# Patient Record
Sex: MALE | Race: ASIAN | Employment: FULL TIME | URBAN - METROPOLITAN AREA
[De-identification: names, ages, dates, MRNs, and addresses within clinical notes are randomized per-mention and may not be internally consistent; named-entity substitution may affect disease eponyms.]

---

## 2020-01-14 ENCOUNTER — OFFICE VISIT (OUTPATIENT)
Dept: INTERNAL MEDICINE CLINIC | Facility: CLINIC | Age: 40
End: 2020-01-14
Payer: COMMERCIAL

## 2020-01-14 VITALS
BODY MASS INDEX: 46.45 KG/M2 | DIASTOLIC BLOOD PRESSURE: 85 MMHG | SYSTOLIC BLOOD PRESSURE: 136 MMHG | HEIGHT: 66 IN | TEMPERATURE: 99 F | RESPIRATION RATE: 20 BRPM | WEIGHT: 289 LBS | HEART RATE: 92 BPM

## 2020-01-14 DIAGNOSIS — L30.9 DERMATITIS: Primary | ICD-10-CM

## 2020-01-14 DIAGNOSIS — Z00.00 PHYSICAL EXAM: ICD-10-CM

## 2020-01-14 DIAGNOSIS — E66.01 MORBID OBESITY (HCC): ICD-10-CM

## 2020-01-14 DIAGNOSIS — M79.671 RIGHT FOOT PAIN: ICD-10-CM

## 2020-01-14 PROCEDURE — 99213 OFFICE O/P EST LOW 20 MIN: CPT | Performed by: INTERNAL MEDICINE

## 2020-01-14 PROCEDURE — 99395 PREV VISIT EST AGE 18-39: CPT | Performed by: INTERNAL MEDICINE

## 2020-01-14 NOTE — PROGRESS NOTES
HPI:    Patient ID: Bunny Phalen is a 42year old male. Patient presents with:     Foot Pain: Pt is coming in as a new pt and has right foot pain that started on 01/06/2020 - no hx of trauma and  physical exam     Patient presents today for physical exam Right Ear: Tympanic membrane, external ear and ear canal normal.   Left Ear: Tympanic membrane, external ear and ear canal normal.   Nose: Nose normal. Right sinus exhibits no maxillary sinus tenderness and no frontal sinus tenderness.  Left sinus exhibits Patient presents today for physical exam, states doing well otherwise, denies chest pain, shortness of breath, dyspnea on exertion or heart palpitations also denies nausea, vomiting, diarrhea, constipation or abdominal pain.  No fever, chills, or UTI sympto

## 2020-01-16 ENCOUNTER — OFFICE VISIT (OUTPATIENT)
Dept: PODIATRY CLINIC | Facility: CLINIC | Age: 40
End: 2020-01-16
Payer: COMMERCIAL

## 2020-01-16 ENCOUNTER — OFFICE VISIT (OUTPATIENT)
Dept: DERMATOLOGY CLINIC | Facility: CLINIC | Age: 40
End: 2020-01-16
Payer: COMMERCIAL

## 2020-01-16 DIAGNOSIS — M76.61 ACHILLES TENDINITIS OF RIGHT LOWER EXTREMITY: Primary | ICD-10-CM

## 2020-01-16 DIAGNOSIS — L30.9 DERMATITIS: Primary | ICD-10-CM

## 2020-01-16 PROCEDURE — 99203 OFFICE O/P NEW LOW 30 MIN: CPT | Performed by: DERMATOLOGY

## 2020-01-16 PROCEDURE — 99243 OFF/OP CNSLTJ NEW/EST LOW 30: CPT | Performed by: PODIATRIST

## 2020-01-16 PROCEDURE — L3060 FOOT ARCH SUPP LONGITUD/META: HCPCS | Performed by: PODIATRIST

## 2020-01-16 NOTE — PROGRESS NOTES
HPI:    Patient ID: Heather Shipley is a 42year old male. This 28-year-old male presents as a new patient to me on consult from [facility]. Patient is here because of he has right heel pain.   He points primarily to the retrocalcaneal area and describes it orders of the defined types were placed in this encounter.       Meds This Visit:  Requested Prescriptions      No prescriptions requested or ordered in this encounter       Imaging & Referrals:  None       #[id number]

## 2020-01-20 ENCOUNTER — TELEPHONE (OUTPATIENT)
Dept: INTERNAL MEDICINE CLINIC | Facility: CLINIC | Age: 40
End: 2020-01-20

## 2020-01-21 NOTE — TELEPHONE ENCOUNTER
Dr. Carlie Marie:     Please clarify. Are you saying patient should work on losing weight before starting Aleve?

## 2020-01-22 NOTE — TELEPHONE ENCOUNTER
Pt stated he was advised by Dr Alli Cloud to take Aleve for 10 days  - he have been on it for 6 days  Pt asking can have Labs done now or Wait after taking it for 10 days

## 2020-01-27 NOTE — PROGRESS NOTES
Miguel Dimas is a 42year old male. Patient presents with:  Eczema: LOV: 3/16/16 past pt of Dr. Troy Ferguson. Presents with complain of eczema on bilateral feet worse on Right foot and hands x 10 years.  Pt denies any family or personal hx of skin cancer Social connections:        Talks on phone: Not on file        Gets together: Not on file        Attends Mu-ism service: Not on file        Active member of club or organization: Not on file        Attends meetings of clubs or organizations: Not on file beard area this is been stable    Past notes/ records and appropriate/relevant lab results including pathology and past body maps reviewed. Updated and new information noted in current visit. Patient presents with concerns above.   Denies any other syst Potential allergen, irritants reviewed as well. Pathophysiology reviewed. Consider Contact allergy in differential.  Patient will let us know how they are doing over the next several weeks. Await clinical response to above therapy.       Wet wraps in par

## 2020-01-30 ENCOUNTER — LAB ENCOUNTER (OUTPATIENT)
Dept: LAB | Age: 40
End: 2020-01-30
Attending: INTERNAL MEDICINE
Payer: COMMERCIAL

## 2020-01-30 ENCOUNTER — OFFICE VISIT (OUTPATIENT)
Dept: PODIATRY CLINIC | Facility: CLINIC | Age: 40
End: 2020-01-30
Payer: COMMERCIAL

## 2020-01-30 DIAGNOSIS — M76.61 ACHILLES TENDINITIS OF RIGHT LOWER EXTREMITY: Primary | ICD-10-CM

## 2020-01-30 DIAGNOSIS — Z00.00 PHYSICAL EXAM: ICD-10-CM

## 2020-01-30 LAB
ALBUMIN SERPL-MCNC: 3.6 G/DL (ref 3.4–5)
ALBUMIN/GLOB SERPL: 0.8 {RATIO} (ref 1–2)
ALP LIVER SERPL-CCNC: 104 U/L (ref 45–117)
ALT SERPL-CCNC: 42 U/L (ref 16–61)
ANION GAP SERPL CALC-SCNC: 7 MMOL/L (ref 0–18)
AST SERPL-CCNC: 27 U/L (ref 15–37)
BACTERIA UR QL AUTO: NEGATIVE /HPF
BASOPHILS # BLD AUTO: 0.04 X10(3) UL (ref 0–0.2)
BASOPHILS NFR BLD AUTO: 0.5 %
BILIRUB SERPL-MCNC: 0.4 MG/DL (ref 0.1–2)
BILIRUB UR QL: NEGATIVE
BUN BLD-MCNC: 11 MG/DL (ref 7–18)
BUN/CREAT SERPL: 14.1 (ref 10–20)
CALCIUM BLD-MCNC: 10.6 MG/DL (ref 8.5–10.1)
CHLORIDE SERPL-SCNC: 108 MMOL/L (ref 98–112)
CHOLEST SMN-MCNC: 177 MG/DL (ref ?–200)
CLARITY UR: CLEAR
CO2 SERPL-SCNC: 24 MMOL/L (ref 21–32)
COLOR UR: YELLOW
CREAT BLD-MCNC: 0.78 MG/DL (ref 0.7–1.3)
DEPRECATED RDW RBC AUTO: 42.4 FL (ref 35.1–46.3)
EOSINOPHIL # BLD AUTO: 0.17 X10(3) UL (ref 0–0.7)
EOSINOPHIL NFR BLD AUTO: 2.1 %
ERYTHROCYTE [DISTWIDTH] IN BLOOD BY AUTOMATED COUNT: 12.2 % (ref 11–15)
GLOBULIN PLAS-MCNC: 4.4 G/DL (ref 2.8–4.4)
GLUCOSE BLD-MCNC: 94 MG/DL (ref 70–99)
GLUCOSE UR-MCNC: NEGATIVE MG/DL
HCT VFR BLD AUTO: 46.3 % (ref 39–53)
HDLC SERPL-MCNC: 36 MG/DL (ref 40–59)
HGB BLD-MCNC: 15.3 G/DL (ref 13–17.5)
IMM GRANULOCYTES # BLD AUTO: 0.04 X10(3) UL (ref 0–1)
IMM GRANULOCYTES NFR BLD: 0.5 %
KETONES UR-MCNC: NEGATIVE MG/DL
LDLC SERPL CALC-MCNC: 109 MG/DL (ref ?–100)
LYMPHOCYTES # BLD AUTO: 2.44 X10(3) UL (ref 1–4)
LYMPHOCYTES NFR BLD AUTO: 29.7 %
M PROTEIN MFR SERPL ELPH: 8 G/DL (ref 6.4–8.2)
MCH RBC QN AUTO: 31.2 PG (ref 26–34)
MCHC RBC AUTO-ENTMCNC: 33 G/DL (ref 31–37)
MCV RBC AUTO: 94.5 FL (ref 80–100)
MONOCYTES # BLD AUTO: 0.47 X10(3) UL (ref 0.1–1)
MONOCYTES NFR BLD AUTO: 5.7 %
NEUTROPHILS # BLD AUTO: 5.05 X10 (3) UL (ref 1.5–7.7)
NEUTROPHILS # BLD AUTO: 5.05 X10(3) UL (ref 1.5–7.7)
NEUTROPHILS NFR BLD AUTO: 61.5 %
NITRITE UR QL STRIP.AUTO: NEGATIVE
NONHDLC SERPL-MCNC: 141 MG/DL (ref ?–130)
OSMOLALITY SERPL CALC.SUM OF ELEC: 287 MOSM/KG (ref 275–295)
PATIENT FASTING Y/N/NP: YES
PATIENT FASTING Y/N/NP: YES
PH UR: 6 [PH] (ref 5–8)
PLATELET # BLD AUTO: 228 10(3)UL (ref 150–450)
POTASSIUM SERPL-SCNC: 3.9 MMOL/L (ref 3.5–5.1)
PROT UR-MCNC: NEGATIVE MG/DL
RBC # BLD AUTO: 4.9 X10(6)UL (ref 4.3–5.7)
RBC #/AREA URNS AUTO: 2 /HPF
SODIUM SERPL-SCNC: 139 MMOL/L (ref 136–145)
SP GR UR STRIP: 1.01 (ref 1–1.03)
TRIGL SERPL-MCNC: 161 MG/DL (ref 30–149)
TSI SER-ACNC: 2.04 MIU/ML (ref 0.36–3.74)
UROBILINOGEN UR STRIP-ACNC: <2
VLDLC SERPL CALC-MCNC: 32 MG/DL (ref 0–30)
WBC # BLD AUTO: 8.2 X10(3) UL (ref 4–11)
WBC #/AREA URNS AUTO: 8 /HPF

## 2020-01-30 PROCEDURE — 81001 URINALYSIS AUTO W/SCOPE: CPT

## 2020-01-30 PROCEDURE — 84443 ASSAY THYROID STIM HORMONE: CPT

## 2020-01-30 PROCEDURE — 99213 OFFICE O/P EST LOW 20 MIN: CPT | Performed by: PODIATRIST

## 2020-01-30 PROCEDURE — 80053 COMPREHEN METABOLIC PANEL: CPT

## 2020-01-30 PROCEDURE — 80061 LIPID PANEL: CPT

## 2020-01-30 PROCEDURE — 85025 COMPLETE CBC W/AUTO DIFF WBC: CPT

## 2020-01-30 PROCEDURE — 36415 COLL VENOUS BLD VENIPUNCTURE: CPT

## 2020-01-30 NOTE — PROGRESS NOTES
HPI:    Patient ID: Last Sexton is a 42year old male. This 72-year-old male presents for follow-up and states on questioning that he is is much is 90% better. He is very happy with the progression of relief of symptom.   Patient would state that he is

## 2020-01-31 ENCOUNTER — TELEPHONE (OUTPATIENT)
Dept: PODIATRY CLINIC | Facility: CLINIC | Age: 40
End: 2020-01-31

## 2020-01-31 NOTE — TELEPHONE ENCOUNTER
S/w pt and he states he checked with his insurance and Orthotics are covered but Dr. Edel Dubon is considered out of network for him. He states Dr. Fred Cao is in network. Pt wants to be seen and have orthotics made by Dr. Fred Cao so they are covered.  Dr. Nicholson Care

## 2020-02-03 NOTE — PROGRESS NOTES
Please call patient with blood test results. Kidney and liver function are normal, no anemia.    Cholesterol is mildly elevated keep with low saturated fat diet ,less red meat ,sausages ,estes and low sugar and carbohydrate diet  sugar is normal,  Calciu

## 2020-02-03 NOTE — TELEPHONE ENCOUNTER
Pt returned the call. Pt scheduled appointment with Dr. Lucius De La Torre on 2-19-20. Does pt need to do anything prior the appointment.   Please call

## 2020-02-03 NOTE — TELEPHONE ENCOUNTER
S/w pt and informed him since he already check with his insurance about orthotics being covered there is nothing else he needs to do prior to appt.

## 2020-02-19 ENCOUNTER — OFFICE VISIT (OUTPATIENT)
Dept: PODIATRY CLINIC | Facility: CLINIC | Age: 40
End: 2020-02-19
Payer: COMMERCIAL

## 2020-02-19 ENCOUNTER — TELEPHONE (OUTPATIENT)
Dept: PODIATRY CLINIC | Facility: CLINIC | Age: 40
End: 2020-02-19

## 2020-02-19 DIAGNOSIS — M76.61 ACHILLES TENDINITIS OF RIGHT LOWER EXTREMITY: Primary | ICD-10-CM

## 2020-02-19 DIAGNOSIS — M72.2 PLANTAR FASCIITIS OF RIGHT FOOT: ICD-10-CM

## 2020-02-19 PROCEDURE — 29799 UNLISTED PX CASTING/STRPG: CPT | Performed by: PODIATRIST

## 2020-02-19 PROCEDURE — L3000 FT INSERT UCB BERKELEY SHELL: HCPCS | Performed by: PODIATRIST

## 2020-02-19 NOTE — PROGRESS NOTES
Raf Fuentes is a 42year old male. Patient presents with:  Orthotic Status: pt in office to have orthotics casted. pt is pt of Dr Rita Arriaga, but then found out Dr Rita Arriaga was out of network. insurance told him that it was ok to be casted by Dr Dexter Steele. chest pain on exertion  GI: denies abdominal pain and denies heartburn  NEURO: denies headaches    EXAM:   There were no vitals taken for this visit.   GENERAL: well developed, well nourished, in no apparent distress  EXTREMITIES:   Today well-maintained th

## 2020-02-25 ENCOUNTER — OFFICE VISIT (OUTPATIENT)
Dept: INTERNAL MEDICINE CLINIC | Facility: CLINIC | Age: 40
End: 2020-02-25
Payer: COMMERCIAL

## 2020-02-25 VITALS
RESPIRATION RATE: 20 BRPM | HEART RATE: 80 BPM | TEMPERATURE: 98 F | HEIGHT: 66 IN | SYSTOLIC BLOOD PRESSURE: 130 MMHG | WEIGHT: 291.81 LBS | DIASTOLIC BLOOD PRESSURE: 80 MMHG | BODY MASS INDEX: 46.9 KG/M2

## 2020-02-25 DIAGNOSIS — E04.9 GOITER: ICD-10-CM

## 2020-02-25 DIAGNOSIS — M79.671 RIGHT FOOT PAIN: ICD-10-CM

## 2020-02-25 DIAGNOSIS — E66.01 MORBID OBESITY (HCC): ICD-10-CM

## 2020-02-25 DIAGNOSIS — E78.00 PURE HYPERCHOLESTEROLEMIA: Primary | ICD-10-CM

## 2020-02-25 PROCEDURE — 99214 OFFICE O/P EST MOD 30 MIN: CPT | Performed by: INTERNAL MEDICINE

## 2020-02-25 NOTE — PROGRESS NOTES
HPI:    Patient ID: Lizzie Mckeon is a 42year old male. Patient presents with:  Patient presents with: Foot Pain: Pt is f/u fromlast visit with foot pain and after seen the podiatry.     Patient presents today for states doing well otherwise  but has • triamcinolone acetonide 0.1 % External Cream Apply topically 2 (two) times daily. 454 g 6     Allergies:Not on File   PHYSICAL EXAM:   Physical Exam   Constitutional: He is oriented to person, place, and time.  He appears well-developed and well-nourished · fruits and vegetables   · Keep active /walking ,exercise as  tolerated  · Reach ideal weight   · Continue present management   Labs discussed with patient continue losing weight    Exercise as tolerated      History alopecia-intermittent   Patient using

## 2020-03-16 ENCOUNTER — HOSPITAL ENCOUNTER (OUTPATIENT)
Dept: ULTRASOUND IMAGING | Age: 40
Discharge: HOME | End: 2020-03-16
Attending: INTERNAL MEDICINE
Payer: COMMERCIAL

## 2020-03-16 DIAGNOSIS — E04.9 GOITER: ICD-10-CM

## 2020-03-16 PROCEDURE — 76536 US EXAM OF HEAD AND NECK: CPT | Performed by: INTERNAL MEDICINE

## 2020-03-16 NOTE — TELEPHONE ENCOUNTER
Orthotics arrived in [place] office 3/16/20    LMTCB    (phone room, pls schedule appt at [address] [place] [address] office for orthotic )

## 2020-03-17 ENCOUNTER — OFFICE VISIT (OUTPATIENT)
Dept: PODIATRY CLINIC | Facility: CLINIC | Age: 40
End: 2020-03-17
Payer: COMMERCIAL

## 2020-03-17 DIAGNOSIS — M72.2 PLANTAR FASCIITIS OF RIGHT FOOT: ICD-10-CM

## 2020-03-17 DIAGNOSIS — M76.61 ACHILLES TENDINITIS OF RIGHT LOWER EXTREMITY: Primary | ICD-10-CM

## 2020-03-17 PROCEDURE — 99212 OFFICE O/P EST SF 10 MIN: CPT | Performed by: PODIATRIST

## 2020-03-19 NOTE — PROGRESS NOTES
Please call patient with ultrasound  test results.  =====  Showing Tiny benign appearing nodules in the right and left lobe of the thyroid. Mildly enlarged thyroid. Will recheck ultrasound thyroid in 1 year to assess stability of the nodules .      shea

## 2020-03-20 NOTE — PROGRESS NOTES
Rocio Dutton is a 42year old male. Patient presents with:  Orthotic Status: pt in office to  orthotics pt with no additional complaints or concerns today        HPI:   Patient returns the clinic to  his orthotics.   At today's visit shruti apparent distress  EXTREMITIES:   Orthoses were checked against the bottom of his feet and found to be of good fit. I fit them into his shoe gear.   ASSESSMENT AND PLAN:   Diagnoses and all orders for this visit:    Achilles tendinitis of right lower extre

## 2020-06-23 ENCOUNTER — OFFICE VISIT (OUTPATIENT)
Dept: INTERNAL MEDICINE CLINIC | Facility: CLINIC | Age: 40
End: 2020-06-23
Payer: COMMERCIAL

## 2020-06-23 VITALS
SYSTOLIC BLOOD PRESSURE: 125 MMHG | HEART RATE: 76 BPM | DIASTOLIC BLOOD PRESSURE: 78 MMHG | WEIGHT: 290 LBS | HEIGHT: 66 IN | BODY MASS INDEX: 46.61 KG/M2

## 2020-06-23 DIAGNOSIS — E66.01 MORBID OBESITY (HCC): ICD-10-CM

## 2020-06-23 DIAGNOSIS — J30.9 ALLERGIC RHINITIS, UNSPECIFIED SEASONALITY, UNSPECIFIED TRIGGER: ICD-10-CM

## 2020-06-23 DIAGNOSIS — E78.00 PURE HYPERCHOLESTEROLEMIA: ICD-10-CM

## 2020-06-23 DIAGNOSIS — M79.671 RIGHT FOOT PAIN: Primary | ICD-10-CM

## 2020-06-23 PROCEDURE — 99214 OFFICE O/P EST MOD 30 MIN: CPT | Performed by: INTERNAL MEDICINE

## 2020-06-23 NOTE — PROGRESS NOTES
HPI:    Patient ID: Fabian Odom is a 42year old male. Patient presents with:   Foot Pain: 3m f/u , right foot painl level of 5, pt states orthodics is helping and had not had pain very often  Chest Congestion: felt chest vibration from May/june well-developed and well-nourished. No distress. Obese   HENT:   Head: Normocephalic and atraumatic.    Right Ear: Tympanic membrane, external ear and ear canal normal.   Left Ear: Tympanic membrane, external ear and ear canal normal.   Nose: Nose normal. turkey, and fish)  Exercise/walk regularly as tolerated  The risks and benefits of the treatment plan have been discussed with the patient  Patient education      Pure hypercholesterolemia  · Keep low saturated fat  diet   · Avoid red meat and fast/fried f

## 2021-04-01 DIAGNOSIS — Z23 NEED FOR VACCINATION: ICD-10-CM

## 2021-12-18 ENCOUNTER — NURSE TRIAGE (OUTPATIENT)
Dept: INTERNAL MEDICINE CLINIC | Facility: CLINIC | Age: 41
End: 2021-12-18

## 2021-12-18 ENCOUNTER — APPOINTMENT (OUTPATIENT)
Dept: CT IMAGING | Age: 41
End: 2021-12-18
Attending: PHYSICIAN ASSISTANT
Payer: COMMERCIAL

## 2021-12-18 ENCOUNTER — HOSPITAL ENCOUNTER (OUTPATIENT)
Age: 41
Discharge: HOME | End: 2021-12-18
Payer: COMMERCIAL

## 2021-12-18 VITALS
DIASTOLIC BLOOD PRESSURE: 90 MMHG | SYSTOLIC BLOOD PRESSURE: 150 MMHG | OXYGEN SATURATION: 98 % | TEMPERATURE: 98 F | RESPIRATION RATE: 18 BRPM | HEART RATE: 72 BPM

## 2021-12-18 DIAGNOSIS — N20.1 URETERAL STONE: Primary | ICD-10-CM

## 2021-12-18 PROCEDURE — 81002 URINALYSIS NONAUTO W/O SCOPE: CPT

## 2021-12-18 PROCEDURE — 99204 OFFICE O/P NEW MOD 45 MIN: CPT

## 2021-12-18 PROCEDURE — 74176 CT ABD & PELVIS W/O CONTRAST: CPT | Performed by: PHYSICIAN ASSISTANT

## 2021-12-18 PROCEDURE — 36415 COLL VENOUS BLD VENIPUNCTURE: CPT

## 2021-12-18 PROCEDURE — 99214 OFFICE O/P EST MOD 30 MIN: CPT

## 2021-12-18 PROCEDURE — 80047 BASIC METABLC PNL IONIZED CA: CPT

## 2021-12-18 PROCEDURE — 85025 COMPLETE CBC W/AUTO DIFF WBC: CPT | Performed by: PHYSICIAN ASSISTANT

## 2021-12-18 RX ORDER — TAMSULOSIN HYDROCHLORIDE 0.4 MG/1
0.4 CAPSULE ORAL DAILY
Qty: 7 CAPSULE | Refills: 0 | Status: SHIPPED | OUTPATIENT
Start: 2021-12-18 | End: 2021-12-25

## 2021-12-18 NOTE — ED PROVIDER NOTES
Patient Seen in: [facility]      History   Patient presents with:  Urinary Symptoms    Stated Complaint: pink urine    Subjective:   HPI    47-year-old male with past medical history of hyperlipidemia here for evaluation of urine discoloratio Neurological:      General: No focal deficit present. Mental Status: He is alert and oriented to person, place, and time.    Psychiatric:         Mood and Affect: Mood normal.         Behavior: Behavior normal.               ED Course     Labs Review

## 2021-12-18 NOTE — TELEPHONE ENCOUNTER
Action Requested: Summary for Provider     []  Critical Lab, Recommendations Needed  [] Need Additional Advice  []   FYI    []   Need Orders  [] Need Medications Sent to Pharmacy  [x]  Other     SUMMARY: Verified name and .   Patient states that he has b

## 2021-12-20 ENCOUNTER — TELEPHONE (OUTPATIENT)
Dept: INTERNAL MEDICINE CLINIC | Facility: CLINIC | Age: 41
End: 2021-12-20

## 2021-12-20 DIAGNOSIS — N20.0 KIDNEY STONES: Primary | ICD-10-CM

## 2021-12-20 NOTE — TELEPHONE ENCOUNTER
Advised patient of Dr. J Carlos Wellington note and number given to urologist. Patient verbalized understanding. Appointment made for 12/22/2021 at 1:20pm with Dr Marcos Wu in [place].

## 2021-12-20 NOTE — TELEPHONE ENCOUNTER
Patient can see me for follow-up visit after the ER  We can use TCM appointment on Wednesday 1:00-p.m.     Follow-up ER visit for kidney stones      Patient to increase water intake    Urology referral placed in the system-

## 2021-12-20 NOTE — TELEPHONE ENCOUNTER
Patient called to inform that he was diagnosed with kidney stones at an [facility] on 12/18/2021. He is requesting a follow up appointment, but there are no openings until 12/30/2021.  Patient would also like to know if he needs to schedule with a urolo

## 2021-12-22 ENCOUNTER — OFFICE VISIT (OUTPATIENT)
Dept: INTERNAL MEDICINE CLINIC | Facility: CLINIC | Age: 41
End: 2021-12-22
Payer: COMMERCIAL

## 2021-12-22 VITALS
BODY MASS INDEX: 47.25 KG/M2 | WEIGHT: 294 LBS | DIASTOLIC BLOOD PRESSURE: 88 MMHG | SYSTOLIC BLOOD PRESSURE: 143 MMHG | HEART RATE: 67 BPM | HEIGHT: 66 IN

## 2021-12-22 DIAGNOSIS — Z00.00 PHYSICAL EXAM: ICD-10-CM

## 2021-12-22 DIAGNOSIS — E78.00 PURE HYPERCHOLESTEROLEMIA: ICD-10-CM

## 2021-12-22 DIAGNOSIS — L65.9 ALOPECIA: ICD-10-CM

## 2021-12-22 DIAGNOSIS — E66.01 MORBID OBESITY (HCC): ICD-10-CM

## 2021-12-22 DIAGNOSIS — N20.0 NEPHROLITHIASIS: Primary | ICD-10-CM

## 2021-12-22 DIAGNOSIS — E04.1 THYROID NODULE: ICD-10-CM

## 2021-12-22 DIAGNOSIS — R73.9 ELEVATED BLOOD SUGAR: ICD-10-CM

## 2021-12-22 PROCEDURE — 3077F SYST BP >= 140 MM HG: CPT | Performed by: INTERNAL MEDICINE

## 2021-12-22 PROCEDURE — 3079F DIAST BP 80-89 MM HG: CPT | Performed by: INTERNAL MEDICINE

## 2021-12-22 PROCEDURE — 3008F BODY MASS INDEX DOCD: CPT | Performed by: INTERNAL MEDICINE

## 2021-12-22 PROCEDURE — 99214 OFFICE O/P EST MOD 30 MIN: CPT | Performed by: INTERNAL MEDICINE

## 2021-12-22 NOTE — PROGRESS NOTES
Subjective:   Patient ID: Lucille Brunner is a 46year old male. Patient presents with:  Urgent Care F/u: Stts that he was seen at [facility] on 12/18/21 due to some blood in the urine.   Hair/Scalp Problem      Patient in office today for  F/u i Appearance: He is well-developed. Interventions: Face mask in place. HENT:      Head: Normocephalic and atraumatic. Nose:      Right Sinus: No maxillary sinus tenderness or frontal sinus tenderness.       Left Sinus: No maxillary sinus tendernes Small fat containing right inguinal hernia.              2Alopecia  Had in 2016 and 2018   Pt  Was on  Steroids  Oral   Local steroid cream   F/u  Derm       3. Pure hypercholesterolemia  Low  Fat diet   Labs     4.  Morbid obesity ([address]  Eat healthy, well

## 2021-12-27 ENCOUNTER — TELEPHONE (OUTPATIENT)
Dept: INTERNAL MEDICINE CLINIC | Facility: CLINIC | Age: 41
End: 2021-12-27

## 2021-12-27 RX ORDER — TAMSULOSIN HYDROCHLORIDE 0.4 MG/1
0.4 CAPSULE ORAL DAILY
Qty: 21 CAPSULE | Refills: 0 | Status: SHIPPED | OUTPATIENT
Start: 2021-12-27 | End: 2022-01-11

## 2021-12-27 NOTE — TELEPHONE ENCOUNTER
Its ok to wait -as long as pt does not have symptoms  blood in urine pain . .   flomax sent to pharmacy

## 2021-12-27 NOTE — TELEPHONE ENCOUNTER
Patient called requesting refill for flomax,  (Generic:tamsulosin) also advising that kidney stone has not passed.    Currently out of medication, finished 7 pill regime on 12/25/2021

## 2021-12-27 NOTE — TELEPHONE ENCOUNTER
Patient states the earliest he could get an appointment with urology is 1/24 with Dr. Esa Feliciano. Patient states he's not in any pain currently, but would like to know if this wait time is ok with Dr. Huber Dyer.

## 2022-01-11 NOTE — TELEPHONE ENCOUNTER
Please review. Protocol failed / No protocol.    Requested Prescriptions   Pending Prescriptions Disp Refills    tamsulosin (FLOMAX) cap [Pharmacy Med Name: TAMSULOSIN HCL 0.4 MG CAPSULE] 21 capsule 0     Sig: TAKE 1 CAPSULE BY MOUTH EVERY DAY        There

## 2022-01-14 RX ORDER — TAMSULOSIN HYDROCHLORIDE 0.4 MG/1
0.4 CAPSULE ORAL DAILY
Qty: 21 CAPSULE | Refills: 0 | Status: SHIPPED | OUTPATIENT
Start: 2022-01-14

## 2022-01-19 NOTE — TELEPHONE ENCOUNTER
Advised patient refill for Flomax sent on 1/14/22 continue to take until appt with urologist on 1/24/22, patient agreed.

## 2022-01-24 ENCOUNTER — OFFICE VISIT (OUTPATIENT)
Dept: SURGERY | Facility: CLINIC | Age: 42
End: 2022-01-24
Payer: COMMERCIAL

## 2022-01-24 ENCOUNTER — HOSPITAL ENCOUNTER (OUTPATIENT)
Dept: GENERAL RADIOLOGY | Age: 42
Discharge: HOME | End: 2022-01-24
Attending: UROLOGY
Payer: COMMERCIAL

## 2022-01-24 ENCOUNTER — TELEPHONE (OUTPATIENT)
Dept: SURGERY | Facility: CLINIC | Age: 42
End: 2022-01-24

## 2022-01-24 VITALS — HEART RATE: 70 BPM | SYSTOLIC BLOOD PRESSURE: 138 MMHG | DIASTOLIC BLOOD PRESSURE: 84 MMHG

## 2022-01-24 DIAGNOSIS — N20.1 URETERAL STONE: ICD-10-CM

## 2022-01-24 DIAGNOSIS — N20.1 URETERAL STONE: Primary | ICD-10-CM

## 2022-01-24 PROCEDURE — 99244 OFF/OP CNSLTJ NEW/EST MOD 40: CPT | Performed by: UROLOGY

## 2022-01-24 PROCEDURE — 3075F SYST BP GE 130 - 139MM HG: CPT | Performed by: UROLOGY

## 2022-01-24 PROCEDURE — 74021 RADEX ABDOMEN 3+ VIEWS: CPT | Performed by: UROLOGY

## 2022-01-24 PROCEDURE — 3079F DIAST BP 80-89 MM HG: CPT | Performed by: UROLOGY

## 2022-01-24 NOTE — TELEPHONE ENCOUNTER
Dear Shaneka Gill,  This patient needs to be scheduled as follows:  Diagnosis: Left ureteral stone  Procedure: Cystoscopy, left ureteroscopy, laser lithotripsy, left stent placement  Site: [facility]  Anesthesia: General endotracheal  Antibiotics

## 2022-01-24 NOTE — PROGRESS NOTES
SUBJECTIVE:  Roque North is a 46year old male who presents for a consultation at the request of, and a copy of this note will be sent to, Dr. Stephanie Bo, for evaluation of  hematuria and urinary stone. He states that the problem is unchanged. for:  None. All other ROS reviewed and otherwise normal.    OBJECTIVE:  /84   Pulse 70   He appears well, in no apparent distress. Alert and oriented times three, pleasant and cooperative.   CARDIOVASCULAR:normal apical impulse  RESPIRATORY: no ches

## 2022-01-25 NOTE — TELEPHONE ENCOUNTER
The radiologist report does not mention a ureteral stone but I believe I do see 1 in his distal left ureter. I would schedule him for at least 2 weeks out for surgery and he should continue to strain the urine.   If he passes the stone we can always cancel

## 2022-01-25 NOTE — TELEPHONE ENCOUNTER
Dear Ana Serrato, can you please take a look at patient' x-ray done yesterday, please advise if patient still needs to proceed with surgery. I will place report on your desk.  Thanks [employer]

## 2022-01-26 NOTE — TELEPHONE ENCOUNTER
Spoke with patient, does not want to schedule right now, will get all labs done, will continue to strain urine as advised, will call back when he' ready to schedule, please transfer to [id number]

## 2022-01-27 NOTE — TELEPHONE ENCOUNTER
[facility] msg received via [facility] sent last evening 1/26 for Dr. Joo Pina.      Msg forwarded to BK - see below to advise.   ______________  Dear Dr. Joo Pina,     I have been discussing the results of my X-Ray test with Lane Keys over the phone, and I'm still confu

## 2022-01-28 ENCOUNTER — LAB ENCOUNTER (OUTPATIENT)
Dept: LAB | Age: 42
End: 2022-01-28
Attending: UROLOGY
Payer: COMMERCIAL

## 2022-01-28 ENCOUNTER — TELEPHONE (OUTPATIENT)
Dept: SURGERY | Facility: CLINIC | Age: 42
End: 2022-01-28

## 2022-01-28 DIAGNOSIS — R73.9 ELEVATED BLOOD SUGAR: ICD-10-CM

## 2022-01-28 DIAGNOSIS — Z01.818 PREOP EXAMINATION: ICD-10-CM

## 2022-01-28 DIAGNOSIS — Z00.00 PHYSICAL EXAM: ICD-10-CM

## 2022-01-28 LAB
ALBUMIN SERPL-MCNC: 3.9 G/DL (ref 3.4–5)
ALBUMIN/GLOB SERPL: 1 {RATIO} (ref 1–2)
ALP LIVER SERPL-CCNC: 113 U/L
ALT SERPL-CCNC: 40 U/L
ANION GAP SERPL CALC-SCNC: 3 MMOL/L (ref 0–18)
AST SERPL-CCNC: 23 U/L (ref 15–37)
BASOPHILS # BLD AUTO: 0.05 X10(3) UL (ref 0–0.2)
BASOPHILS NFR BLD AUTO: 0.6 %
BILIRUB SERPL-MCNC: 0.5 MG/DL (ref 0.1–2)
BILIRUB UR QL: NEGATIVE
BUN BLD-MCNC: 9 MG/DL (ref 7–18)
BUN/CREAT SERPL: 11.3 (ref 10–20)
CALCIUM BLD-MCNC: 11.3 MG/DL (ref 8.5–10.1)
CHLORIDE SERPL-SCNC: 103 MMOL/L (ref 98–112)
CHOLEST SERPL-MCNC: 185 MG/DL (ref ?–200)
CLARITY UR: CLEAR
CO2 SERPL-SCNC: 28 MMOL/L (ref 21–32)
COLOR UR: YELLOW
CREAT BLD-MCNC: 0.8 MG/DL
DEPRECATED RDW RBC AUTO: 42.3 FL (ref 35.1–46.3)
EOSINOPHIL # BLD AUTO: 0.22 X10(3) UL (ref 0–0.7)
EOSINOPHIL NFR BLD AUTO: 2.5 %
ERYTHROCYTE [DISTWIDTH] IN BLOOD BY AUTOMATED COUNT: 12.1 % (ref 11–15)
EST. AVERAGE GLUCOSE BLD GHB EST-MCNC: 131 MG/DL (ref 68–126)
FASTING PATIENT LIPID ANSWER: YES
FASTING STATUS PATIENT QL REPORTED: YES
GLOBULIN PLAS-MCNC: 3.9 G/DL (ref 2.8–4.4)
GLUCOSE BLD-MCNC: 105 MG/DL (ref 70–99)
GLUCOSE UR-MCNC: NEGATIVE MG/DL
HBA1C MFR BLD: 6.2 % (ref ?–5.7)
HCT VFR BLD AUTO: 45.7 %
HDLC SERPL-MCNC: 42 MG/DL (ref 40–59)
HGB BLD-MCNC: 14.9 G/DL
IMM GRANULOCYTES # BLD AUTO: 0.03 X10(3) UL (ref 0–1)
IMM GRANULOCYTES NFR BLD: 0.3 %
LDLC SERPL CALC-MCNC: 115 MG/DL (ref ?–100)
LYMPHOCYTES # BLD AUTO: 2.61 X10(3) UL (ref 1–4)
LYMPHOCYTES NFR BLD AUTO: 29.6 %
MCH RBC QN AUTO: 31 PG (ref 26–34)
MCHC RBC AUTO-ENTMCNC: 32.6 G/DL (ref 31–37)
MCV RBC AUTO: 95.2 FL
MONOCYTES # BLD AUTO: 0.53 X10(3) UL (ref 0.1–1)
MONOCYTES NFR BLD AUTO: 6 %
NEUTROPHILS # BLD AUTO: 5.39 X10 (3) UL (ref 1.5–7.7)
NEUTROPHILS # BLD AUTO: 5.39 X10(3) UL (ref 1.5–7.7)
NEUTROPHILS NFR BLD AUTO: 61 %
NITRITE UR QL STRIP.AUTO: NEGATIVE
NONHDLC SERPL-MCNC: 143 MG/DL (ref ?–130)
OSMOLALITY SERPL CALC.SUM OF ELEC: 277 MOSM/KG (ref 275–295)
PH UR: 7 [PH] (ref 5–8)
PLATELET # BLD AUTO: 228 10(3)UL (ref 150–450)
POTASSIUM SERPL-SCNC: 4 MMOL/L (ref 3.5–5.1)
PROT SERPL-MCNC: 7.8 G/DL (ref 6.4–8.2)
PROT UR-MCNC: NEGATIVE MG/DL
RBC # BLD AUTO: 4.8 X10(6)UL
SODIUM SERPL-SCNC: 134 MMOL/L (ref 136–145)
SP GR UR STRIP: 1 (ref 1–1.03)
TRIGL SERPL-MCNC: 157 MG/DL (ref 30–149)
TSI SER-ACNC: 2.07 MIU/ML (ref 0.36–3.74)
UROBILINOGEN UR STRIP-ACNC: <2
VLDLC SERPL CALC-MCNC: 27 MG/DL (ref 0–30)
WBC # BLD AUTO: 8.8 X10(3) UL (ref 4–11)

## 2022-01-28 PROCEDURE — 84443 ASSAY THYROID STIM HORMONE: CPT

## 2022-01-28 PROCEDURE — 87086 URINE CULTURE/COLONY COUNT: CPT | Performed by: UROLOGY

## 2022-01-28 PROCEDURE — 81001 URINALYSIS AUTO W/SCOPE: CPT

## 2022-01-28 PROCEDURE — 80061 LIPID PANEL: CPT

## 2022-01-28 PROCEDURE — 80053 COMPREHEN METABOLIC PANEL: CPT

## 2022-01-28 PROCEDURE — 36415 COLL VENOUS BLD VENIPUNCTURE: CPT

## 2022-01-28 PROCEDURE — 85025 COMPLETE CBC W/AUTO DIFF WBC: CPT

## 2022-01-28 PROCEDURE — 83036 HEMOGLOBIN GLYCOSYLATED A1C: CPT

## 2022-01-28 NOTE — TELEPHONE ENCOUNTER
Mindy Callahan patient sent you a my chart message, stating that although I explained x-ray results to him, he needs more clarity regarding surgery, please advise, thanks jenn

## 2022-02-01 NOTE — TELEPHONE ENCOUNTER
S/W pt and gave the results in [facility] msg as stated below and pt verbalized understanding and compliance. [facility]. # given.

## 2022-02-01 NOTE — TELEPHONE ENCOUNTER
I tried to call the patient. Left a voicemail. Urology staff please call him back. Let him know that I reviewed his x-ray. Although the stone is not seen based on radiologist interpretation I suspect it may still be there as he continues to have some symptoms. In order to answer the question of whether the stone is persistent or is passed I would recommend repeating a CT stone protocol. An order for this has been entered. Please have them try to obtain this this week if possible.

## 2022-02-04 ENCOUNTER — TELEPHONE (OUTPATIENT)
Dept: SURGERY | Facility: CLINIC | Age: 42
End: 2022-02-04

## 2022-02-09 ENCOUNTER — HOSPITAL ENCOUNTER (OUTPATIENT)
Dept: CT IMAGING | Age: 42
Discharge: HOME | End: 2022-02-09
Attending: UROLOGY
Payer: COMMERCIAL

## 2022-02-09 DIAGNOSIS — N20.1 LEFT URETERAL STONE: ICD-10-CM

## 2022-02-09 PROCEDURE — 74176 CT ABD & PELVIS W/O CONTRAST: CPT | Performed by: UROLOGY

## 2022-02-09 NOTE — TELEPHONE ENCOUNTER
Reviewed patient's questions. The stone should be seen as it is a sizable stone on his previous CT scan. Small little flecks of white are not likely to be the stone and it is not likely to fragment into fragments this small but likely stone fragments that should be felt and seen clearly by the patient. I am not sure which lab result specifically had questions on. His urine culture is negative and his urine analysis is unremarkable. I would recommend that he proceed with a stone protocol CT as discussed in follow-up shortly afterwards for an office visit to review those results with me. It is important the CT scan is done to answer the question of whether the stone is still persistent.

## 2022-02-09 NOTE — TELEPHONE ENCOUNTER
Patient was contacted and stated that he had a CT Scan done today 2/9/2022. Patient was informed of test results for last CT Scan. Patient was asking if he needs to continue Tamsulosin 4mg. Because he needs a refill patient only has 3 left.

## 2022-02-10 NOTE — TELEPHONE ENCOUNTER
Call patient back. Let him know his CT scan shows migration and resolution of his previously obstructing ureteral stone. Tiny if he is scheduled for any procedures please cancel those. He may discontinue tamsulosin. He does have a sizable stone on the right side measuring about a centimeter or so in size. He may wish to consider ESWL shockwave treatment to address the stone although this does not need to be done on an emergency basis could be done at his discretion if he is asymptomatic.

## 2022-02-10 NOTE — TELEPHONE ENCOUNTER
Spoke with pt and notified him of result note from ALTHEA. Ok to stop flomax. Reassured him that no ureteral stone present based on CT scan. Pt wondering when to follow up. Instructed pt to follow up on at least a yearly basis with ALTHEA, and if develops symptoms to come back on prn basis. Also instructed pt to call office if symptoms such as flank pain, n/v, urinating symptoms, or visible blood in urine occur. Verbalized understanding.

## 2022-10-21 ENCOUNTER — HOSPITAL ENCOUNTER (OUTPATIENT)
Dept: ULTRASOUND IMAGING | Age: 42
Discharge: HOME | End: 2022-10-21
Attending: INTERNAL MEDICINE
Payer: COMMERCIAL

## 2022-10-21 DIAGNOSIS — E04.1 THYROID NODULE: ICD-10-CM

## 2022-10-21 PROCEDURE — 76536 US EXAM OF HEAD AND NECK: CPT | Performed by: INTERNAL MEDICINE

## 2022-10-28 ENCOUNTER — LAB ENCOUNTER (OUTPATIENT)
Dept: LAB | Age: 42
End: 2022-10-28
Attending: INTERNAL MEDICINE
Payer: COMMERCIAL

## 2022-10-28 DIAGNOSIS — E04.9 ENLARGED THYROID GLAND: ICD-10-CM

## 2022-10-28 DIAGNOSIS — Z00.00 PHYSICAL EXAM: ICD-10-CM

## 2022-10-28 DIAGNOSIS — E78.00 PURE HYPERCHOLESTEROLEMIA: ICD-10-CM

## 2022-10-28 DIAGNOSIS — E04.1 THYROID NODULE: ICD-10-CM

## 2022-10-28 LAB
ALBUMIN SERPL-MCNC: 3.8 G/DL (ref 3.4–5)
ALBUMIN/GLOB SERPL: 1 {RATIO} (ref 1–2)
ALP LIVER SERPL-CCNC: 111 U/L
ALT SERPL-CCNC: 38 U/L
ANION GAP SERPL CALC-SCNC: 6 MMOL/L (ref 0–18)
AST SERPL-CCNC: 22 U/L (ref 15–37)
BASOPHILS # BLD AUTO: 0.07 X10(3) UL (ref 0–0.2)
BASOPHILS NFR BLD AUTO: 0.9 %
BILIRUB SERPL-MCNC: 0.4 MG/DL (ref 0.1–2)
BILIRUB UR QL: NEGATIVE
BUN BLD-MCNC: 9 MG/DL (ref 7–18)
BUN/CREAT SERPL: 11.1 (ref 10–20)
CALCIUM BLD-MCNC: 10.4 MG/DL (ref 8.5–10.1)
CHLORIDE SERPL-SCNC: 104 MMOL/L (ref 98–112)
CHOLEST SERPL-MCNC: 174 MG/DL (ref ?–200)
CLARITY UR: CLEAR
CO2 SERPL-SCNC: 27 MMOL/L (ref 21–32)
COLOR UR: COLORLESS
CREAT BLD-MCNC: 0.81 MG/DL
DEPRECATED RDW RBC AUTO: 43.3 FL (ref 35.1–46.3)
EOSINOPHIL # BLD AUTO: 0.28 X10(3) UL (ref 0–0.7)
EOSINOPHIL NFR BLD AUTO: 3.4 %
ERYTHROCYTE [DISTWIDTH] IN BLOOD BY AUTOMATED COUNT: 12.2 % (ref 11–15)
FASTING PATIENT LIPID ANSWER: YES
FASTING STATUS PATIENT QL REPORTED: YES
GFR SERPLBLD BASED ON 1.73 SQ M-ARVRAT: 114 ML/MIN/1.73M2 (ref 60–?)
GLOBULIN PLAS-MCNC: 3.9 G/DL (ref 2.8–4.4)
GLUCOSE BLD-MCNC: 107 MG/DL (ref 70–99)
GLUCOSE UR-MCNC: NEGATIVE MG/DL
HCT VFR BLD AUTO: 47.2 %
HDLC SERPL-MCNC: 39 MG/DL (ref 40–59)
HGB BLD-MCNC: 14.9 G/DL
IMM GRANULOCYTES # BLD AUTO: 0.03 X10(3) UL (ref 0–1)
IMM GRANULOCYTES NFR BLD: 0.4 %
KETONES UR-MCNC: NEGATIVE MG/DL
LDLC SERPL CALC-MCNC: 111 MG/DL (ref ?–100)
LEUKOCYTE ESTERASE UR QL STRIP.AUTO: NEGATIVE
LYMPHOCYTES # BLD AUTO: 2.54 X10(3) UL (ref 1–4)
LYMPHOCYTES NFR BLD AUTO: 31.2 %
MCH RBC QN AUTO: 30.5 PG (ref 26–34)
MCHC RBC AUTO-ENTMCNC: 31.6 G/DL (ref 31–37)
MCV RBC AUTO: 96.7 FL
MONOCYTES # BLD AUTO: 0.42 X10(3) UL (ref 0.1–1)
MONOCYTES NFR BLD AUTO: 5.2 %
NEUTROPHILS # BLD AUTO: 4.8 X10 (3) UL (ref 1.5–7.7)
NEUTROPHILS # BLD AUTO: 4.8 X10(3) UL (ref 1.5–7.7)
NEUTROPHILS NFR BLD AUTO: 58.9 %
NITRITE UR QL STRIP.AUTO: NEGATIVE
NONHDLC SERPL-MCNC: 135 MG/DL (ref ?–130)
OSMOLALITY SERPL CALC.SUM OF ELEC: 283 MOSM/KG (ref 275–295)
PH UR: 7 [PH] (ref 5–8)
PLATELET # BLD AUTO: 218 10(3)UL (ref 150–450)
POTASSIUM SERPL-SCNC: 3.9 MMOL/L (ref 3.5–5.1)
PROT SERPL-MCNC: 7.7 G/DL (ref 6.4–8.2)
PROT UR-MCNC: NEGATIVE MG/DL
PTH-INTACT SERPL-MCNC: 170.7 PG/ML (ref 18.5–88)
RBC # BLD AUTO: 4.88 X10(6)UL
SODIUM SERPL-SCNC: 137 MMOL/L (ref 136–145)
SP GR UR STRIP: 1 (ref 1–1.03)
T4 FREE SERPL-MCNC: 1 NG/DL (ref 0.8–1.7)
TRIGL SERPL-MCNC: 133 MG/DL (ref 30–149)
TSI SER-ACNC: 2.43 MIU/ML (ref 0.36–3.74)
UROBILINOGEN UR STRIP-ACNC: <2
VIT C UR-MCNC: NEGATIVE MG/DL
VLDLC SERPL CALC-MCNC: 23 MG/DL (ref 0–30)
WBC # BLD AUTO: 8.1 X10(3) UL (ref 4–11)

## 2022-10-28 PROCEDURE — 80061 LIPID PANEL: CPT

## 2022-10-28 PROCEDURE — 80053 COMPREHEN METABOLIC PANEL: CPT

## 2022-10-28 PROCEDURE — 36415 COLL VENOUS BLD VENIPUNCTURE: CPT

## 2022-10-28 PROCEDURE — 85025 COMPLETE CBC W/AUTO DIFF WBC: CPT

## 2022-10-28 PROCEDURE — 84439 ASSAY OF FREE THYROXINE: CPT

## 2022-10-28 PROCEDURE — 81001 URINALYSIS AUTO W/SCOPE: CPT

## 2022-10-28 PROCEDURE — 84443 ASSAY THYROID STIM HORMONE: CPT

## 2022-10-28 PROCEDURE — 83970 ASSAY OF PARATHORMONE: CPT

## 2022-11-01 ENCOUNTER — TELEPHONE (OUTPATIENT)
Dept: INTERNAL MEDICINE CLINIC | Facility: CLINIC | Age: 42
End: 2022-11-01

## 2022-11-01 NOTE — TELEPHONE ENCOUNTER
patient  Has apts  - already  Soon - to see Endo and  ENT specialist .  Recommend to keep for both appointments  and follow-up with me as scheduled.

## 2022-11-01 NOTE — TELEPHONE ENCOUNTER
Patient calling to ask if should be seeing both ENT and endocrinologist, or if just needs to see endocrinologist. Requesting call back as soon as possible as ENT appointment is this Thursday.

## 2022-11-02 ENCOUNTER — OFFICE VISIT (OUTPATIENT)
Dept: INTERNAL MEDICINE CLINIC | Facility: CLINIC | Age: 42
End: 2022-11-02
Payer: COMMERCIAL

## 2022-11-02 VITALS
TEMPERATURE: 98 F | SYSTOLIC BLOOD PRESSURE: 144 MMHG | BODY MASS INDEX: 47.09 KG/M2 | DIASTOLIC BLOOD PRESSURE: 84 MMHG | WEIGHT: 293 LBS | HEIGHT: 66 IN | OXYGEN SATURATION: 97 % | HEART RATE: 83 BPM

## 2022-11-02 DIAGNOSIS — E21.3 HYPERPARATHYROIDISM (HCC): Primary | ICD-10-CM

## 2022-11-02 DIAGNOSIS — R05.9 COUGH, UNSPECIFIED TYPE: ICD-10-CM

## 2022-11-02 DIAGNOSIS — E21.5 PARATHYROID GLAND DISORDER (HCC): ICD-10-CM

## 2022-11-02 PROCEDURE — 3079F DIAST BP 80-89 MM HG: CPT | Performed by: INTERNAL MEDICINE

## 2022-11-02 PROCEDURE — 3008F BODY MASS INDEX DOCD: CPT | Performed by: INTERNAL MEDICINE

## 2022-11-02 PROCEDURE — 99214 OFFICE O/P EST MOD 30 MIN: CPT | Performed by: INTERNAL MEDICINE

## 2022-11-02 PROCEDURE — 3077F SYST BP >= 140 MM HG: CPT | Performed by: INTERNAL MEDICINE

## 2022-11-03 ENCOUNTER — OFFICE VISIT (OUTPATIENT)
Dept: OTOLARYNGOLOGY | Facility: CLINIC | Age: 42
End: 2022-11-03
Payer: COMMERCIAL

## 2022-11-03 DIAGNOSIS — D35.1 PARATHYROID ADENOMA: Primary | ICD-10-CM

## 2022-11-03 PROCEDURE — 99203 OFFICE O/P NEW LOW 30 MIN: CPT | Performed by: OTOLARYNGOLOGY

## 2022-11-15 ENCOUNTER — HOSPITAL ENCOUNTER (OUTPATIENT)
Dept: NUCLEAR MEDICINE | Facility: HOSPITAL | Age: 42
Discharge: HOME | End: 2022-11-15
Attending: OTOLARYNGOLOGY
Payer: COMMERCIAL

## 2022-11-15 DIAGNOSIS — D35.1 PARATHYROID ADENOMA: ICD-10-CM

## 2022-11-15 PROCEDURE — 78071 PARATHYRD PLANAR W/WO SUBTRJ: CPT | Performed by: OTOLARYNGOLOGY

## 2022-11-18 ENCOUNTER — TELEPHONE (OUTPATIENT)
Dept: OTOLARYNGOLOGY | Facility: CLINIC | Age: 42
End: 2022-11-18

## 2022-11-18 NOTE — TELEPHONE ENCOUNTER
Terence Azar MD  P  Ent Clinical Staff  Please let patient know that study showed uptake on the same side as the mass was noted on the ultrasound. RTC to go over the scan results and to discuss management. Spoke with jaren and notified him of the note above. Appt scheduled for 11/29/22.

## 2022-11-29 ENCOUNTER — OFFICE VISIT (OUTPATIENT)
Dept: OTOLARYNGOLOGY | Facility: CLINIC | Age: 42
End: 2022-11-29
Payer: COMMERCIAL

## 2022-11-29 DIAGNOSIS — D35.1 PARATHYROID ADENOMA: Primary | ICD-10-CM

## 2022-11-29 PROCEDURE — 99214 OFFICE O/P EST MOD 30 MIN: CPT | Performed by: OTOLARYNGOLOGY

## 2022-11-29 RX ORDER — ERGOCALCIFEROL (VITAMIN D2) 1250 MCG
50000 CAPSULE ORAL
COMMUNITY
Start: 2022-11-21

## 2022-11-30 ENCOUNTER — PATIENT MESSAGE (OUTPATIENT)
Dept: INTERNAL MEDICINE CLINIC | Facility: CLINIC | Age: 42
End: 2022-11-30

## 2022-11-30 DIAGNOSIS — E21.3 HYPERPARATHYROIDISM (HCC): Primary | ICD-10-CM

## 2022-11-30 DIAGNOSIS — D35.1 PARATHYROID ADENOMA: ICD-10-CM

## 2022-11-30 DIAGNOSIS — E21.5 PARATHYROID GLAND DISORDER (HCC): ICD-10-CM

## 2022-12-01 ENCOUNTER — TELEPHONE (OUTPATIENT)
Dept: OTOLARYNGOLOGY | Facility: CLINIC | Age: 42
End: 2022-12-01

## 2022-12-01 NOTE — TELEPHONE ENCOUNTER
Noted this referral might not be covered by patient insurance   I am sending referral it might be outside of the network      We need to check with insurance recommend patient to call insurance as well     referral request sent  to manage care sending-for further recommendation -on possible other in network referral  available for pt

## 2022-12-01 NOTE — TELEPHONE ENCOUNTER
From: Dex Kessler  To: Enio Vidal MD  Sent: 11/30/2022 12:29 PM CST  Subject: Referral Request    Hi Dr. Alan Calvin,    I have met with Dr. Sushil Trejo regarding my primary hyperparathyroidism, and he is recommending surgery to remove the adenoma. However, I would also like to explore options for a minimally invasive surgery that Dr. Sushil Trejo does not offer. So, I would like to request a referral from you to Dr. Temo Cristina or Dr. Randa Casey (both located at [address] [place] [address]). I called [facility], and they told me that they cannot schedule an appointment unless I have a referral from my primary physician. Also, Dr. Sushil Trejo mentioned that if I proceed with the surgery with him, he will request for you to check and clear me to have the surgery. Before, I have this exam, I would like to first consult with the surgeons at [facility].     Thanks,    Ellyn Talamantes

## 2022-12-01 NOTE — TELEPHONE ENCOUNTER
Patient states trying to get a second opinion, requested cpt coded to verify if procedure is covered under plan. Informed patient Medical clearance is required by Dr. Lisa Thomas. Patient will call when ready to schedule for procedure.

## 2022-12-02 ENCOUNTER — PATIENT MESSAGE (OUTPATIENT)
Dept: INTERNAL MEDICINE CLINIC | Facility: CLINIC | Age: 42
End: 2022-12-02

## 2022-12-19 ENCOUNTER — MED REC SCAN ONLY (OUTPATIENT)
Dept: INTERNAL MEDICINE CLINIC | Facility: CLINIC | Age: 42
End: 2022-12-19

## 2023-01-27 ENCOUNTER — OFFICE VISIT (OUTPATIENT)
Dept: INTERNAL MEDICINE CLINIC | Facility: CLINIC | Age: 43
End: 2023-01-27

## 2023-01-27 VITALS
SYSTOLIC BLOOD PRESSURE: 124 MMHG | HEART RATE: 68 BPM | HEIGHT: 66 IN | RESPIRATION RATE: 20 BRPM | OXYGEN SATURATION: 98 % | WEIGHT: 292 LBS | BODY MASS INDEX: 46.93 KG/M2 | DIASTOLIC BLOOD PRESSURE: 75 MMHG

## 2023-01-27 DIAGNOSIS — N20.0 NEPHROLITHIASIS: ICD-10-CM

## 2023-01-27 DIAGNOSIS — E78.00 PURE HYPERCHOLESTEROLEMIA: ICD-10-CM

## 2023-01-27 DIAGNOSIS — E21.0 PRIMARY HYPERPARATHYROIDISM (HCC): ICD-10-CM

## 2023-01-27 DIAGNOSIS — Z01.810 PREOP CARDIOVASCULAR EXAM: Primary | ICD-10-CM

## 2023-01-27 PROCEDURE — 3078F DIAST BP <80 MM HG: CPT | Performed by: INTERNAL MEDICINE

## 2023-01-27 PROCEDURE — 3074F SYST BP LT 130 MM HG: CPT | Performed by: INTERNAL MEDICINE

## 2023-01-27 PROCEDURE — 90471 IMMUNIZATION ADMIN: CPT | Performed by: INTERNAL MEDICINE

## 2023-01-27 PROCEDURE — 3008F BODY MASS INDEX DOCD: CPT | Performed by: INTERNAL MEDICINE

## 2023-01-27 PROCEDURE — 99215 OFFICE O/P EST HI 40 MIN: CPT | Performed by: INTERNAL MEDICINE

## 2023-01-27 PROCEDURE — 90715 TDAP VACCINE 7 YRS/> IM: CPT | Performed by: INTERNAL MEDICINE

## 2023-02-10 ENCOUNTER — LAB ENCOUNTER (OUTPATIENT)
Dept: LAB | Age: 43
End: 2023-02-10
Attending: INTERNAL MEDICINE
Payer: COMMERCIAL

## 2023-02-10 DIAGNOSIS — E78.00 PURE HYPERCHOLESTEROLEMIA: ICD-10-CM

## 2023-02-10 DIAGNOSIS — Z01.810 PREOP CARDIOVASCULAR EXAM: ICD-10-CM

## 2023-02-10 LAB
ALBUMIN SERPL-MCNC: 3.6 G/DL (ref 3.4–5)
ALBUMIN/GLOB SERPL: 0.9 {RATIO} (ref 1–2)
ALP LIVER SERPL-CCNC: 92 U/L
ALT SERPL-CCNC: 33 U/L
ANION GAP SERPL CALC-SCNC: 5 MMOL/L (ref 0–18)
AST SERPL-CCNC: 17 U/L (ref 15–37)
BASOPHILS # BLD AUTO: 0.05 X10(3) UL (ref 0–0.2)
BASOPHILS NFR BLD AUTO: 0.6 %
BILIRUB SERPL-MCNC: 0.4 MG/DL (ref 0.1–2)
BUN BLD-MCNC: 10 MG/DL (ref 7–18)
BUN/CREAT SERPL: 13.3 (ref 10–20)
CALCIUM BLD-MCNC: 10.7 MG/DL (ref 8.5–10.1)
CHLORIDE SERPL-SCNC: 108 MMOL/L (ref 98–112)
CO2 SERPL-SCNC: 26 MMOL/L (ref 21–32)
CREAT BLD-MCNC: 0.75 MG/DL
DEPRECATED RDW RBC AUTO: 43.1 FL (ref 35.1–46.3)
EOSINOPHIL # BLD AUTO: 0.24 X10(3) UL (ref 0–0.7)
EOSINOPHIL NFR BLD AUTO: 2.7 %
ERYTHROCYTE [DISTWIDTH] IN BLOOD BY AUTOMATED COUNT: 12.2 % (ref 11–15)
FASTING STATUS PATIENT QL REPORTED: YES
GFR SERPLBLD BASED ON 1.73 SQ M-ARVRAT: 117 ML/MIN/1.73M2 (ref 60–?)
GLOBULIN PLAS-MCNC: 4.1 G/DL (ref 2.8–4.4)
GLUCOSE BLD-MCNC: 103 MG/DL (ref 70–99)
HCT VFR BLD AUTO: 45.1 %
HGB BLD-MCNC: 14.6 G/DL
IMM GRANULOCYTES # BLD AUTO: 0.03 X10(3) UL (ref 0–1)
IMM GRANULOCYTES NFR BLD: 0.3 %
LYMPHOCYTES # BLD AUTO: 2.85 X10(3) UL (ref 1–4)
LYMPHOCYTES NFR BLD AUTO: 32.4 %
MCH RBC QN AUTO: 30.9 PG (ref 26–34)
MCHC RBC AUTO-ENTMCNC: 32.4 G/DL (ref 31–37)
MCV RBC AUTO: 95.6 FL
MONOCYTES # BLD AUTO: 0.55 X10(3) UL (ref 0.1–1)
MONOCYTES NFR BLD AUTO: 6.3 %
NEUTROPHILS # BLD AUTO: 5.08 X10 (3) UL (ref 1.5–7.7)
NEUTROPHILS # BLD AUTO: 5.08 X10(3) UL (ref 1.5–7.7)
NEUTROPHILS NFR BLD AUTO: 57.7 %
OSMOLALITY SERPL CALC.SUM OF ELEC: 287 MOSM/KG (ref 275–295)
PLATELET # BLD AUTO: 217 10(3)UL (ref 150–450)
POTASSIUM SERPL-SCNC: 4.5 MMOL/L (ref 3.5–5.1)
PROT SERPL-MCNC: 7.7 G/DL (ref 6.4–8.2)
RBC # BLD AUTO: 4.72 X10(6)UL
SODIUM SERPL-SCNC: 139 MMOL/L (ref 136–145)
WBC # BLD AUTO: 8.8 X10(3) UL (ref 4–11)

## 2023-02-10 PROCEDURE — 85025 COMPLETE CBC W/AUTO DIFF WBC: CPT

## 2023-02-10 PROCEDURE — 36415 COLL VENOUS BLD VENIPUNCTURE: CPT

## 2023-02-10 PROCEDURE — 80053 COMPREHEN METABOLIC PANEL: CPT

## 2023-02-13 LAB — AMB EXT COVID-19 RESULT: NOT DETECTED

## 2023-02-23 ENCOUNTER — MED REC SCAN ONLY (OUTPATIENT)
Dept: INTERNAL MEDICINE CLINIC | Facility: CLINIC | Age: 43
End: 2023-02-23

## 2023-04-14 ENCOUNTER — HOSPITAL ENCOUNTER (OUTPATIENT)
Age: 43
Discharge: HOME | End: 2023-04-14
Attending: EMERGENCY MEDICINE
Payer: COMMERCIAL

## 2023-04-14 VITALS
RESPIRATION RATE: 18 BRPM | TEMPERATURE: 98 F | DIASTOLIC BLOOD PRESSURE: 86 MMHG | HEART RATE: 98 BPM | SYSTOLIC BLOOD PRESSURE: 155 MMHG | OXYGEN SATURATION: 100 %

## 2023-04-14 DIAGNOSIS — M79.672 FOOT PAIN, BILATERAL: Primary | ICD-10-CM

## 2023-04-14 DIAGNOSIS — M79.671 FOOT PAIN, BILATERAL: Primary | ICD-10-CM

## 2023-04-14 PROCEDURE — 99212 OFFICE O/P EST SF 10 MIN: CPT

## 2023-04-14 PROCEDURE — 99213 OFFICE O/P EST LOW 20 MIN: CPT

## 2023-04-14 NOTE — ED INITIAL ASSESSMENT (HPI)
Patient arrived ambulatory to room c/o bilateral foot pain. Patient has had chronic intermittent pain dx with plantar fascitis. Patient states pain has worsened over the last month.

## 2023-08-29 ENCOUNTER — MED REC SCAN ONLY (OUTPATIENT)
Dept: INTERNAL MEDICINE CLINIC | Facility: CLINIC | Age: 43
End: 2023-08-29

## (undated) DIAGNOSIS — Z01.818 PREOP EXAMINATION: ICD-10-CM

## (undated) DIAGNOSIS — N20.1 LEFT URETERAL STONE: Primary | ICD-10-CM

## (undated) NOTE — LETTER
January 16, 2020         Antelmo Ann MD  [address] [place] [id number]      Patient: Rosario Gamboa   YOB: 1980   Date of Visit: 1/16/2020       Dear Dr. Goldie Garcia MD,    I saw your patient, Rosario Gamboa, on 1/16/2020.  Enclosed

## (undated) NOTE — LETTER
Jovan Blake Md  [address] [place] [address],  [address] [place] [address]       11/03/22        Patient: Bentley Garcia   YOB: 1980   Date of Visit: 11/3/2022       Dear  Dr. Davonte Hernandez MD,      Thank you for referring Bentley Garcia to my practice. Please find my assessment and plan below. ASSESSMENT AND PLAN    1. Parathyroid adenoma  We reviewed his ultrasound which demonstrates a 2.7 cm structure in the right superior pole suspicious for parathyroid adenoma. He does have an elevated calcium level at 10.4 as well as a PTH of 170. I did recommend that he undergo a sestamibi nuclear scan with SPECT to evaluate for uptake in this area and when he returns we will discuss surgical management further. We did briefly discuss exploration and removal of parathyroid adenoma as the typical management process for this issue.  - NM PARATHYROID WITH SPECT (CPT=78071); Future               Sincerely,   Madonna Cota. Belinda Camarena MD   [address] [place] [address]  [address] [address]    Document electronically generated by:  Madonna Cota.  Belinda Camarena MD

## (undated) NOTE — LETTER
01/24/22        [facility]      Dear Corina Woo,    [facility] records indicate that you have outstanding lab work and or testing that was ordered for you and has not yet been completed:  Orders Placed This Encounter      CB

## (undated) NOTE — Clinical Note
Anselmo De La O if you could please contact this patient he did get a second opinion at [facility] regarding parathyroidectomy and if you could please contact him and ask him if he has any further questions regarding surgery and let him know that you would be the person that he will contact if he wishes to proceed with surgery here at [facility].  Thank you very much